# Patient Record
Sex: MALE | Race: WHITE
[De-identification: names, ages, dates, MRNs, and addresses within clinical notes are randomized per-mention and may not be internally consistent; named-entity substitution may affect disease eponyms.]

---

## 2021-09-27 PROBLEM — Z00.00 ENCOUNTER FOR PREVENTIVE HEALTH EXAMINATION: Status: ACTIVE | Noted: 2021-09-27

## 2021-10-05 ENCOUNTER — APPOINTMENT (OUTPATIENT)
Dept: ORTHOPEDIC SURGERY | Facility: CLINIC | Age: 41
End: 2021-10-05
Payer: COMMERCIAL

## 2021-10-05 VITALS
HEIGHT: 74 IN | WEIGHT: 175 LBS | DIASTOLIC BLOOD PRESSURE: 78 MMHG | BODY MASS INDEX: 22.46 KG/M2 | SYSTOLIC BLOOD PRESSURE: 122 MMHG | HEART RATE: 67 BPM

## 2021-10-05 PROCEDURE — 99204 OFFICE O/P NEW MOD 45 MIN: CPT

## 2021-10-12 NOTE — PHYSICAL EXAM
[FreeTextEntry1] : On exam today the patient stands in good balance.  He has full range of motion of bilateral shoulders elbows wrist hands as well as in his lower extremities.  In the areas that he mentioned including his right shoulder his left forearm his right thigh and his abdomen he has subcutaneous masses.  These are consistent with lipomas or lipoma variants.  There minimally tender mobile and somewhat firm.  There is no skin lesions associated with it.  In his right shoulder he also has one approximately 2 cm and mildly bothering him.  He is still has normal motion.  He has no lymphadenopathy and is neurovascularly intact. [General Appearance - Well-Appearing] : Well appearing [General Appearance - Well Nourished] : well nourished [Oriented To Time, Place, And Person] : Oriented to person, place, and time [Impaired Insight] : Insight and judgment were intact [Affect] : The affect was normal. [Mood] : the mood was normal [Sclera] : the sclera and conjunctiva were normal [Neck Cervical Mass (___cm)] : no neck mass was observed [Apical Impulse] : the apical impulse was normal [] : No respiratory distress [Abdomen Soft] : Soft [Normal Station and Gait] : gait and station were normal

## 2021-10-12 NOTE — DATA REVIEWED
[Imaging Present] : Present [de-identified] : Focused ultrasound today of the area shows a subcutaneous mass approximately 1 cm along the right shoulder.  Looking at the other ones around the abdomen similarly show some well-circumscribed lesions between 1 and 2 cm.  There is no flow seen in any of them.

## 2021-10-12 NOTE — HISTORY OF PRESENT ILLNESS
[FreeTextEntry1] : This 40-year-old gentleman who comes in with a history of multiple soft tissue masses.  He thinks he has been growing over the past few years.  They are sometimes subcutaneous however the one in the right shoulder he thinks has been getting bigger.  He also has 1 along his abdomen and his right thigh.  These are subcutaneous mass approximately 1 cm.  There is minimal tenderness unless they are palpated and percussed. [Stable] : stable [1] : currently ~his/her~ pain is 1 out of 10 [Direct Pressure] : worsened by direct pressure

## 2021-10-12 NOTE — DISCUSSION/SUMMARY
[All Questions Answered] : Patient (and family) had all questions answered to an agreeable level of satisfaction [Interested in Proceeding] : Patient (and family) expressed understanding and interest in proceeding with the plan as outlined [de-identified] : Patient is interested in resection.  Prior to resection I would get an MRI scan noted to be able to see this soft tissue mass little bit better.  I think this could be a lipoma or lipoma variant such as angiolipoma regardless I think would be appropriate for the patient to have some imaging of this.  Following this we can discuss whether will be appropriate for resection or not.\par \par If imaging was ordered, the patient was told to make an appointment to review findings right after all imaging is completed.\par \par We discussed risks, benefits and alternatives. Rationale of care was reviewed and all questions were answered. Patient (and family) had all questions answered to her degree of the level of satisfaction. Patient (and family) expressed understanding and interest in proceeding with the plan as outlined.\par \par \par \par \par This note was done with a voice recognition transcription software and any typos are related to this rather than medical error. Surgical risks reviewed. Patient (and family) had all questions answered to an agreeable level of satisfaction. Patient (and family) expressed understanding and interest in proceeding with the plan as outlined.  \par

## 2021-10-12 NOTE — REVIEW OF SYSTEMS
[Feeling Tired] : not feeling tired [Recent Wt Gain ___ (lbs)] : no recent weight gain [Joint Pain] : no joint pain [Joint Stiffness] : no joint stiffness [Joint Swelling] : no joint swelling [Nl] : Hematologic/Lymphatic

## 2021-10-21 ENCOUNTER — APPOINTMENT (OUTPATIENT)
Dept: ORTHOPEDIC SURGERY | Facility: CLINIC | Age: 41
End: 2021-10-21
Payer: COMMERCIAL

## 2021-10-21 PROCEDURE — 99214 OFFICE O/P EST MOD 30 MIN: CPT | Mod: 95

## 2021-10-29 NOTE — DATA REVIEWED
[Imaging Present] : Present [de-identified] : MRI scan from October 13, 2021 shows a 16 x 6 x 9 mm lesion in the subcutaneous tissues superficial to the lateral deltoid just under the skin.  There is thought to be vascular malformation versus nerve sheath tumor.  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

## 2021-10-29 NOTE — DISCUSSION/SUMMARY
[Surgical risks reviewed] : Surgical risks reviewed [All Questions Answered] : Patient (and family) had all questions answered to an agreeable level of satisfaction [Interested in Proceeding] : Patient (and family) expressed understanding and interest in proceeding with the plan as outlined [de-identified] : Patient is interested in doing surgery to take this off.  He understands this will be very small local anesthesia type procedure.  We will send this mass to pathology when it is done.  He understands risk of malignancy as well as need for further surgery as well as numbness in the area as well as recurrence.  He is going to move forward.  We will schedule him based on his convenience.  He understands this is a dressing should be very little downtime for this.\par \par If imaging was ordered, the patient was told to make an appointment to review findings right after all imaging is completed.\par \par We discussed risks, benefits and alternatives. Rationale of care was reviewed and all questions were answered. Patient (and family) had all questions answered to her degree of the level of satisfaction. Patient (and family) expressed understanding and interest in proceeding with the plan as outlined.\par \par \par \par \par This note was done with a voice recognition transcription software and any typos are related to this rather than medical error. Surgical risks reviewed. Patient (and family) had all questions answered to an agreeable level of satisfaction. Patient (and family) expressed understanding and interest in proceeding with the plan as outlined.  \par

## 2021-10-29 NOTE — PHYSICAL EXAM
[FreeTextEntry1] : On exam the mass is still there.  It is  with palpation.  He is able to move around appropriately.  He has full range of motion of his shoulder.  He has no numbness distally. [General Appearance - Well-Appearing] : Well appearing [General Appearance - Well Nourished] : well nourished [Normal Station and Gait] : gait and station were normal [Tenderness] : tenderness [Masses] : masses [Skin Changes - Describe changes:] : No skin changes noted [Normal] : No palpable lymph nodes in the supraclavicular, posterior auricular, or axillary node beds. [Full UE ROM unless otherwise noted:] : Full range of motion unless otherwise noted. [UE Motor Strength Normal unless otherwise noted:] : 5/5 strength in bilateral upper extremities unless otherwise noted.

## 2021-10-29 NOTE — HISTORY OF PRESENT ILLNESS
[Other Location: e.g. School (Enter Location, City,State)___] : at [unfilled], at the time of the visit. [Medical Office: ([facility])___] : at the medical office located in  [Verbal consent obtained from patient] : the patient, [unfilled] [FreeTextEntry1] : Patient is having same symptoms as before.  The mass in his wrist is hurting him the most.  He is otherwise able to move around.  The other lesions are not bothering him as much. [Stable] : stable [1] : currently ~his/her~ pain is 1 out of 10 [Direct Pressure] : worsened by direct pressure [None] : No relieving factors are noted

## 2021-11-04 ENCOUNTER — APPOINTMENT (OUTPATIENT)
Dept: ORTHOPEDIC SURGERY | Facility: CLINIC | Age: 41
End: 2021-11-04
Payer: COMMERCIAL

## 2021-11-04 ENCOUNTER — LABORATORY RESULT (OUTPATIENT)
Age: 41
End: 2021-11-04

## 2021-11-04 VITALS
HEIGHT: 73 IN | OXYGEN SATURATION: 98 % | HEART RATE: 68 BPM | DIASTOLIC BLOOD PRESSURE: 80 MMHG | BODY MASS INDEX: 24.12 KG/M2 | WEIGHT: 182 LBS | SYSTOLIC BLOOD PRESSURE: 127 MMHG

## 2021-11-04 DIAGNOSIS — R22.30 LOCALIZED SWELLING, MASS AND LUMP, UNSPECIFIED UPPER LIMB: ICD-10-CM

## 2021-11-04 PROCEDURE — 23075 EXC SHOULDER LES SC < 3 CM: CPT

## 2021-11-05 NOTE — PROCEDURE
[de-identified] : FIRST ASSISTANT: ORA Cleary\par \par PREOPERATIVE DIAGNOSIS: Right shoulder mass\par POSTOPERATIVE DIAGNOSIS: Right shoulder mass\par NATURE OF OPERATION: Resection of right shoulder mass\par \par ANESTHESIA: Local.\par \par COMPLICATIONS: 	None.\par ESTIMATED BLOOD LOSS: 	Minimal.\par \par INDICATION FOR PROCEDURE: This is a 40-year-old gentleman who has had a painful right shoulder mass for a few months.  The mass has been there for a long time and has not gotten significantly bigger however he does have pain in the area.  MRI showed a subcutaneous 2 cm mass.  Because he has some tenderness over the area he was indicated for resection.  He understood risk benefits and alternatives and signed consent at the time of surgery.\par \par SURGICAL PROCEDURE: \par \par The patient was brought to the procedure room. Confirmation of planned procedure and operative site was performed. The arm was prepped and draped in standard sterile fashion with ChloraPrep.  He had already been preinjected with 8 mL of 1% lidocaine as a field block.\par \par At this point using sterile technique a 2 cm incision was made directly over the area and only in the skin.  We dissected in the subcutaneous area until you are able to see the mass.  We went completely surrounding the mass to the underlying soft tissue until able to take out the mass all as 1 piece.  This was sent for permanent pathology in formalin.  There was no significant bleeding.  Appropriate irrigation was done.  The area was able to be closed appropriately with 3-0 Vicryl as well as Dermabond over the skin.  4 x 4 and Tegaderm were then placed.  Patient tolerated the procedure well.\par \par \par POSTOPERATIVE PLAN: The patient will keep the dressing on until follow up in 10-14 days. They are free for regular range of motion. Pathology was sent. I will see the patient back in the office in 10 to 14 days for an incision check. Printed postoperative instructions were given to the patient including call back and return criteria for complications.\par

## 2021-11-18 ENCOUNTER — APPOINTMENT (OUTPATIENT)
Dept: ORTHOPEDIC SURGERY | Facility: CLINIC | Age: 41
End: 2021-11-18
Payer: COMMERCIAL

## 2021-11-18 VITALS
OXYGEN SATURATION: 96 % | HEART RATE: 59 BPM | DIASTOLIC BLOOD PRESSURE: 66 MMHG | SYSTOLIC BLOOD PRESSURE: 127 MMHG | HEIGHT: 73 IN | WEIGHT: 182 LBS | BODY MASS INDEX: 24.12 KG/M2

## 2021-11-18 DIAGNOSIS — D23.9 OTHER BENIGN NEOPLASM OF SKIN, UNSPECIFIED: ICD-10-CM

## 2021-11-18 PROCEDURE — 99024 POSTOP FOLLOW-UP VISIT: CPT

## 2021-11-18 NOTE — HISTORY OF PRESENT ILLNESS
[___ Weeks Post Op] : [unfilled] weeks post op [0] : no pain reported [Doing Well] : is doing well [Excellent Pain Control] : has excellent pain control [No Sign of Infection] : is showing no signs of infection [de-identified] : 11/4/2021 - resection of right shoulder mass [de-identified] : Patient is doing fine at this point. He has no pain. He is moving normally [de-identified] : On exam, his incision is clean, and dry. steri strips applied. 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 [de-identified] : Doing well post op. This is benign, with low chance of recurrence. [de-identified] : Follow up as needed.